# Patient Record
Sex: FEMALE | Race: WHITE | Employment: UNEMPLOYED | URBAN - METROPOLITAN AREA
[De-identification: names, ages, dates, MRNs, and addresses within clinical notes are randomized per-mention and may not be internally consistent; named-entity substitution may affect disease eponyms.]

---

## 2017-02-22 PROCEDURE — 87624 HPV HI-RISK TYP POOLED RSLT: CPT | Performed by: OBSTETRICS & GYNECOLOGY

## 2017-02-22 PROCEDURE — 88175 CYTOPATH C/V AUTO FLUID REDO: CPT | Performed by: OBSTETRICS & GYNECOLOGY

## 2017-09-28 PROBLEM — M50.10 HERNIATION OF CERVICAL INTERVERTEBRAL DISC WITH RADICULOPATHY: Status: ACTIVE | Noted: 2017-09-28

## 2017-09-28 PROBLEM — M48.02 CERVICAL STENOSIS OF SPINE: Status: ACTIVE | Noted: 2017-09-28

## 2017-10-11 PROCEDURE — 87081 CULTURE SCREEN ONLY: CPT | Performed by: FAMILY MEDICINE

## 2017-10-30 PROCEDURE — 81001 URINALYSIS AUTO W/SCOPE: CPT | Performed by: FAMILY MEDICINE

## 2017-10-30 PROCEDURE — 87086 URINE CULTURE/COLONY COUNT: CPT | Performed by: FAMILY MEDICINE

## 2017-11-10 ENCOUNTER — ANESTHESIA (OUTPATIENT)
Dept: SURGERY | Facility: HOSPITAL | Age: 32
DRG: 473 | End: 2017-11-10
Payer: COMMERCIAL

## 2017-11-10 ENCOUNTER — APPOINTMENT (OUTPATIENT)
Dept: GENERAL RADIOLOGY | Facility: HOSPITAL | Age: 32
DRG: 473 | End: 2017-11-10
Attending: ORTHOPAEDIC SURGERY
Payer: COMMERCIAL

## 2017-11-10 ENCOUNTER — HOSPITAL ENCOUNTER (INPATIENT)
Facility: HOSPITAL | Age: 32
LOS: 2 days | Discharge: HOME | DRG: 473 | End: 2017-11-12
Attending: ORTHOPAEDIC SURGERY | Admitting: ORTHOPAEDIC SURGERY
Payer: COMMERCIAL

## 2017-11-10 ENCOUNTER — ANESTHESIA EVENT (OUTPATIENT)
Dept: SURGERY | Facility: HOSPITAL | Age: 32
DRG: 473 | End: 2017-11-10
Payer: COMMERCIAL

## 2017-11-10 ENCOUNTER — SURGERY (OUTPATIENT)
Age: 32
End: 2017-11-10

## 2017-11-10 DIAGNOSIS — M40.12 OTHER SECONDARY KYPHOSIS, CERVICAL REGION: ICD-10-CM

## 2017-11-10 DIAGNOSIS — M48.02 CERVICAL STENOSIS OF SPINE: Primary | ICD-10-CM

## 2017-11-10 DIAGNOSIS — M50.10 HERNIATION OF CERVICAL INTERVERTEBRAL DISC WITH RADICULOPATHY: ICD-10-CM

## 2017-11-10 PROCEDURE — 95938 SOMATOSENSORY TESTING: CPT | Performed by: ORTHOPAEDIC SURGERY

## 2017-11-10 PROCEDURE — 95860 NEEDLE EMG 1 EXTREMITY: CPT | Performed by: ORTHOPAEDIC SURGERY

## 2017-11-10 PROCEDURE — 95939 C MOTOR EVOKED UPR&LWR LIMBS: CPT | Performed by: ORTHOPAEDIC SURGERY

## 2017-11-10 PROCEDURE — 72040 X-RAY EXAM NECK SPINE 2-3 VW: CPT | Performed by: ORTHOPAEDIC SURGERY

## 2017-11-10 PROCEDURE — 85027 COMPLETE CBC AUTOMATED: CPT | Performed by: PHYSICIAN ASSISTANT

## 2017-11-10 PROCEDURE — 81025 URINE PREGNANCY TEST: CPT

## 2017-11-10 PROCEDURE — 76001 XR C-ARM FLUORO >1 HOUR  (CPT=76001): CPT | Performed by: ORTHOPAEDIC SURGERY

## 2017-11-10 PROCEDURE — 0RB30ZZ EXCISION OF CERVICAL VERTEBRAL DISC, OPEN APPROACH: ICD-10-PCS | Performed by: ORTHOPAEDIC SURGERY

## 2017-11-10 PROCEDURE — 95865 NEEDLE EMG LARYNX: CPT | Performed by: ORTHOPAEDIC SURGERY

## 2017-11-10 PROCEDURE — 0RG20A0 FUSION OF 2 OR MORE CERVICAL VERTEBRAL JOINTS WITH INTERBODY FUSION DEVICE, ANTERIOR APPROACH, ANTERIOR COLUMN, OPEN APPROACH: ICD-10-PCS | Performed by: ORTHOPAEDIC SURGERY

## 2017-11-10 RX ORDER — MORPHINE SULFATE 10 MG/ML
6 INJECTION, SOLUTION INTRAMUSCULAR; INTRAVENOUS EVERY 10 MIN PRN
Status: DISCONTINUED | OUTPATIENT
Start: 2017-11-10 | End: 2017-11-10 | Stop reason: HOSPADM

## 2017-11-10 RX ORDER — HALOPERIDOL 5 MG/ML
0.25 INJECTION INTRAMUSCULAR ONCE AS NEEDED
Status: DISCONTINUED | OUTPATIENT
Start: 2017-11-10 | End: 2017-11-10 | Stop reason: HOSPADM

## 2017-11-10 RX ORDER — ROCURONIUM BROMIDE 10 MG/ML
INJECTION, SOLUTION INTRAVENOUS AS NEEDED
Status: DISCONTINUED | OUTPATIENT
Start: 2017-11-10 | End: 2017-11-10 | Stop reason: SURG

## 2017-11-10 RX ORDER — SODIUM CHLORIDE, SODIUM LACTATE, POTASSIUM CHLORIDE, CALCIUM CHLORIDE 600; 310; 30; 20 MG/100ML; MG/100ML; MG/100ML; MG/100ML
INJECTION, SOLUTION INTRAVENOUS CONTINUOUS
Status: DISCONTINUED | OUTPATIENT
Start: 2017-11-10 | End: 2017-11-12

## 2017-11-10 RX ORDER — TIZANIDINE 4 MG/1
4 TABLET ORAL ONCE
Status: COMPLETED | OUTPATIENT
Start: 2017-11-10 | End: 2017-11-10

## 2017-11-10 RX ORDER — MIDAZOLAM HYDROCHLORIDE 1 MG/ML
INJECTION INTRAMUSCULAR; INTRAVENOUS AS NEEDED
Status: DISCONTINUED | OUTPATIENT
Start: 2017-11-10 | End: 2017-11-10 | Stop reason: SURG

## 2017-11-10 RX ORDER — SODIUM CHLORIDE 9 MG/ML
INJECTION, SOLUTION INTRAVENOUS CONTINUOUS PRN
Status: DISCONTINUED | OUTPATIENT
Start: 2017-11-10 | End: 2017-11-10 | Stop reason: SURG

## 2017-11-10 RX ORDER — ONDANSETRON 2 MG/ML
4 INJECTION INTRAMUSCULAR; INTRAVENOUS EVERY 4 HOURS PRN
Status: ACTIVE | OUTPATIENT
Start: 2017-11-10 | End: 2017-11-11

## 2017-11-10 RX ORDER — LIDOCAINE HYDROCHLORIDE 40 MG/ML
SOLUTION TOPICAL AS NEEDED
Status: DISCONTINUED | OUTPATIENT
Start: 2017-11-10 | End: 2017-11-10 | Stop reason: SURG

## 2017-11-10 RX ORDER — LIDOCAINE HYDROCHLORIDE 10 MG/ML
INJECTION, SOLUTION EPIDURAL; INFILTRATION; INTRACAUDAL; PERINEURAL AS NEEDED
Status: DISCONTINUED | OUTPATIENT
Start: 2017-11-10 | End: 2017-11-10 | Stop reason: SURG

## 2017-11-10 RX ORDER — DEXAMETHASONE SODIUM PHOSPHATE 4 MG/ML
8 VIAL (ML) INJECTION EVERY 8 HOURS
Status: COMPLETED | OUTPATIENT
Start: 2017-11-10 | End: 2017-11-11

## 2017-11-10 RX ORDER — METOCLOPRAMIDE HYDROCHLORIDE 5 MG/ML
10 INJECTION INTRAMUSCULAR; INTRAVENOUS EVERY 6 HOURS PRN
Status: DISCONTINUED | OUTPATIENT
Start: 2017-11-10 | End: 2017-11-12

## 2017-11-10 RX ORDER — ONDANSETRON 2 MG/ML
INJECTION INTRAMUSCULAR; INTRAVENOUS AS NEEDED
Status: DISCONTINUED | OUTPATIENT
Start: 2017-11-10 | End: 2017-11-10 | Stop reason: SURG

## 2017-11-10 RX ORDER — DIAZEPAM 5 MG/1
5 TABLET ORAL EVERY 8 HOURS PRN
Status: DISCONTINUED | OUTPATIENT
Start: 2017-11-10 | End: 2017-11-12

## 2017-11-10 RX ORDER — CETIRIZINE HYDROCHLORIDE 10 MG/1
5 TABLET ORAL 2 TIMES DAILY PRN
Status: DISCONTINUED | OUTPATIENT
Start: 2017-11-10 | End: 2017-11-12

## 2017-11-10 RX ORDER — HYDROCODONE BITARTRATE AND ACETAMINOPHEN 10; 325 MG/1; MG/1
2 TABLET ORAL EVERY 6 HOURS PRN
Status: DISCONTINUED | OUTPATIENT
Start: 2017-11-10 | End: 2017-11-12

## 2017-11-10 RX ORDER — ACETAMINOPHEN 500 MG
1000 TABLET ORAL ONCE
Status: COMPLETED | OUTPATIENT
Start: 2017-11-10 | End: 2017-11-10

## 2017-11-10 RX ORDER — METHYLPREDNISOLONE 4 MG/1
TABLET ORAL
Qty: 21 TABLET | Refills: 0 | Status: SHIPPED | OUTPATIENT
Start: 2017-11-10 | End: 2017-12-21

## 2017-11-10 RX ORDER — DEXAMETHASONE SODIUM PHOSPHATE 4 MG/ML
VIAL (ML) INJECTION AS NEEDED
Status: DISCONTINUED | OUTPATIENT
Start: 2017-11-10 | End: 2017-11-10 | Stop reason: SURG

## 2017-11-10 RX ORDER — HYDROCODONE BITARTRATE AND ACETAMINOPHEN 5; 325 MG/1; MG/1
1 TABLET ORAL AS NEEDED
Status: DISCONTINUED | OUTPATIENT
Start: 2017-11-10 | End: 2017-11-10 | Stop reason: HOSPADM

## 2017-11-10 RX ORDER — DIPHENHYDRAMINE HYDROCHLORIDE 50 MG/ML
25 INJECTION INTRAMUSCULAR; INTRAVENOUS EVERY 4 HOURS PRN
Status: DISCONTINUED | OUTPATIENT
Start: 2017-11-10 | End: 2017-11-12

## 2017-11-10 RX ORDER — DIPHENHYDRAMINE HCL 25 MG
25 CAPSULE ORAL EVERY 4 HOURS PRN
Status: DISCONTINUED | OUTPATIENT
Start: 2017-11-10 | End: 2017-11-12

## 2017-11-10 RX ORDER — BISACODYL 10 MG
10 SUPPOSITORY, RECTAL RECTAL
Status: DISCONTINUED | OUTPATIENT
Start: 2017-11-10 | End: 2017-11-12

## 2017-11-10 RX ORDER — ONDANSETRON 2 MG/ML
4 INJECTION INTRAMUSCULAR; INTRAVENOUS ONCE AS NEEDED
Status: DISCONTINUED | OUTPATIENT
Start: 2017-11-10 | End: 2017-11-10 | Stop reason: HOSPADM

## 2017-11-10 RX ORDER — MAGNESIUM HYDROXIDE 1200 MG/15ML
LIQUID ORAL CONTINUOUS PRN
Status: DISCONTINUED | OUTPATIENT
Start: 2017-11-10 | End: 2017-11-10

## 2017-11-10 RX ORDER — DOCUSATE SODIUM 100 MG/1
100 CAPSULE, LIQUID FILLED ORAL 2 TIMES DAILY
Status: DISCONTINUED | OUTPATIENT
Start: 2017-11-10 | End: 2017-11-12

## 2017-11-10 RX ORDER — SENNOSIDES 8.6 MG
17.2 TABLET ORAL NIGHTLY
Status: DISCONTINUED | OUTPATIENT
Start: 2017-11-10 | End: 2017-11-12

## 2017-11-10 RX ORDER — HYDROMORPHONE HYDROCHLORIDE 1 MG/ML
0.2 INJECTION, SOLUTION INTRAMUSCULAR; INTRAVENOUS; SUBCUTANEOUS EVERY 5 MIN PRN
Status: DISCONTINUED | OUTPATIENT
Start: 2017-11-10 | End: 2017-11-10 | Stop reason: HOSPADM

## 2017-11-10 RX ORDER — MORPHINE SULFATE 4 MG/ML
4 INJECTION, SOLUTION INTRAMUSCULAR; INTRAVENOUS EVERY 10 MIN PRN
Status: DISCONTINUED | OUTPATIENT
Start: 2017-11-10 | End: 2017-11-10 | Stop reason: HOSPADM

## 2017-11-10 RX ORDER — DEXAMETHASONE SODIUM PHOSPHATE 4 MG/ML
10 VIAL (ML) INJECTION ONCE
Status: DISCONTINUED | OUTPATIENT
Start: 2017-11-10 | End: 2017-11-10 | Stop reason: HOSPADM

## 2017-11-10 RX ORDER — EPHEDRINE SULFATE 50 MG/ML
INJECTION, SOLUTION INTRAVENOUS AS NEEDED
Status: DISCONTINUED | OUTPATIENT
Start: 2017-11-10 | End: 2017-11-10 | Stop reason: SURG

## 2017-11-10 RX ORDER — POLYETHYLENE GLYCOL 3350 17 G/17G
17 POWDER, FOR SOLUTION ORAL DAILY PRN
Status: DISCONTINUED | OUTPATIENT
Start: 2017-11-10 | End: 2017-11-12

## 2017-11-10 RX ORDER — GABAPENTIN 300 MG/1
600 CAPSULE ORAL ONCE
Status: COMPLETED | OUTPATIENT
Start: 2017-11-10 | End: 2017-11-10

## 2017-11-10 RX ORDER — FAMOTIDINE 20 MG/1
20 TABLET ORAL ONCE
Status: COMPLETED | OUTPATIENT
Start: 2017-11-10 | End: 2017-11-10

## 2017-11-10 RX ORDER — HYDROCODONE BITARTRATE AND ACETAMINOPHEN 10; 325 MG/1; MG/1
1 TABLET ORAL EVERY 4 HOURS PRN
Status: DISCONTINUED | OUTPATIENT
Start: 2017-11-10 | End: 2017-11-12

## 2017-11-10 RX ORDER — SODIUM PHOSPHATE, DIBASIC AND SODIUM PHOSPHATE, MONOBASIC 7; 19 G/133ML; G/133ML
1 ENEMA RECTAL ONCE AS NEEDED
Status: DISCONTINUED | OUTPATIENT
Start: 2017-11-10 | End: 2017-11-12

## 2017-11-10 RX ORDER — ACETAMINOPHEN 325 MG/1
650 TABLET ORAL EVERY 4 HOURS PRN
Status: DISCONTINUED | OUTPATIENT
Start: 2017-11-10 | End: 2017-11-12

## 2017-11-10 RX ORDER — NALOXONE HYDROCHLORIDE 0.4 MG/ML
80 INJECTION, SOLUTION INTRAMUSCULAR; INTRAVENOUS; SUBCUTANEOUS AS NEEDED
Status: DISCONTINUED | OUTPATIENT
Start: 2017-11-10 | End: 2017-11-10 | Stop reason: HOSPADM

## 2017-11-10 RX ORDER — MORPHINE SULFATE 2 MG/ML
2 INJECTION, SOLUTION INTRAMUSCULAR; INTRAVENOUS EVERY 10 MIN PRN
Status: DISCONTINUED | OUTPATIENT
Start: 2017-11-10 | End: 2017-11-10 | Stop reason: HOSPADM

## 2017-11-10 RX ORDER — HYDROMORPHONE HYDROCHLORIDE 1 MG/ML
0.8 INJECTION, SOLUTION INTRAMUSCULAR; INTRAVENOUS; SUBCUTANEOUS EVERY 2 HOUR PRN
Status: DISCONTINUED | OUTPATIENT
Start: 2017-11-10 | End: 2017-11-12

## 2017-11-10 RX ORDER — DEXAMETHASONE SODIUM PHOSPHATE 10 MG/ML
8 INJECTION, SOLUTION INTRAMUSCULAR; INTRAVENOUS EVERY 8 HOURS
Status: DISCONTINUED | OUTPATIENT
Start: 2017-11-10 | End: 2017-11-10 | Stop reason: RX

## 2017-11-10 RX ORDER — METOCLOPRAMIDE 10 MG/1
10 TABLET ORAL ONCE
Status: COMPLETED | OUTPATIENT
Start: 2017-11-10 | End: 2017-11-10

## 2017-11-10 RX ORDER — SCOLOPAMINE TRANSDERMAL SYSTEM 1 MG/1
1 PATCH, EXTENDED RELEASE TRANSDERMAL ONCE
Status: DISCONTINUED | OUTPATIENT
Start: 2017-11-10 | End: 2017-11-10

## 2017-11-10 RX ORDER — HYDROMORPHONE HYDROCHLORIDE 1 MG/ML
0.6 INJECTION, SOLUTION INTRAMUSCULAR; INTRAVENOUS; SUBCUTANEOUS EVERY 5 MIN PRN
Status: DISCONTINUED | OUTPATIENT
Start: 2017-11-10 | End: 2017-11-10 | Stop reason: HOSPADM

## 2017-11-10 RX ORDER — BUPIVACAINE HYDROCHLORIDE 2.5 MG/ML
INJECTION, SOLUTION EPIDURAL; INFILTRATION; INTRACAUDAL AS NEEDED
Status: DISCONTINUED | OUTPATIENT
Start: 2017-11-10 | End: 2017-11-10 | Stop reason: HOSPADM

## 2017-11-10 RX ORDER — HYDROMORPHONE HYDROCHLORIDE 1 MG/ML
1.2 INJECTION, SOLUTION INTRAMUSCULAR; INTRAVENOUS; SUBCUTANEOUS EVERY 2 HOUR PRN
Status: DISCONTINUED | OUTPATIENT
Start: 2017-11-10 | End: 2017-11-12

## 2017-11-10 RX ORDER — HYDROMORPHONE HYDROCHLORIDE 1 MG/ML
0.4 INJECTION, SOLUTION INTRAMUSCULAR; INTRAVENOUS; SUBCUTANEOUS EVERY 2 HOUR PRN
Status: DISCONTINUED | OUTPATIENT
Start: 2017-11-10 | End: 2017-11-12

## 2017-11-10 RX ORDER — HYDROMORPHONE HYDROCHLORIDE 1 MG/ML
0.4 INJECTION, SOLUTION INTRAMUSCULAR; INTRAVENOUS; SUBCUTANEOUS EVERY 5 MIN PRN
Status: DISCONTINUED | OUTPATIENT
Start: 2017-11-10 | End: 2017-11-10 | Stop reason: HOSPADM

## 2017-11-10 RX ORDER — HYDROCODONE BITARTRATE AND ACETAMINOPHEN 5; 325 MG/1; MG/1
2 TABLET ORAL AS NEEDED
Status: DISCONTINUED | OUTPATIENT
Start: 2017-11-10 | End: 2017-11-10 | Stop reason: HOSPADM

## 2017-11-10 RX ORDER — SODIUM CHLORIDE 9 MG/ML
INJECTION, SOLUTION INTRAVENOUS CONTINUOUS
Status: DISCONTINUED | OUTPATIENT
Start: 2017-11-10 | End: 2017-11-12

## 2017-11-10 RX ADMIN — EPHEDRINE SULFATE 5 MG: 50 INJECTION, SOLUTION INTRAVENOUS at 08:04:00

## 2017-11-10 RX ADMIN — SODIUM CHLORIDE, SODIUM LACTATE, POTASSIUM CHLORIDE, CALCIUM CHLORIDE: 600; 310; 30; 20 INJECTION, SOLUTION INTRAVENOUS at 10:15:00

## 2017-11-10 RX ADMIN — SODIUM CHLORIDE: 9 INJECTION, SOLUTION INTRAVENOUS at 07:55:00

## 2017-11-10 RX ADMIN — ONDANSETRON 4 MG: 2 INJECTION INTRAMUSCULAR; INTRAVENOUS at 09:55:00

## 2017-11-10 RX ADMIN — HYDROMORPHONE HYDROCHLORIDE 0.5 MG: 1 INJECTION, SOLUTION INTRAMUSCULAR; INTRAVENOUS; SUBCUTANEOUS at 09:00:00

## 2017-11-10 RX ADMIN — ROCURONIUM BROMIDE 10 MG: 10 INJECTION, SOLUTION INTRAVENOUS at 07:44:00

## 2017-11-10 RX ADMIN — MIDAZOLAM HYDROCHLORIDE 2 MG: 1 INJECTION INTRAMUSCULAR; INTRAVENOUS at 07:39:00

## 2017-11-10 RX ADMIN — DEXAMETHASONE SODIUM PHOSPHATE 4 MG: 4 MG/ML VIAL (ML) INJECTION at 07:55:00

## 2017-11-10 RX ADMIN — LIDOCAINE HYDROCHLORIDE 4 ML: 40 SOLUTION TOPICAL at 07:45:00

## 2017-11-10 RX ADMIN — HYDROMORPHONE HYDROCHLORIDE 0.2 MG: 1 INJECTION, SOLUTION INTRAMUSCULAR; INTRAVENOUS; SUBCUTANEOUS at 10:32:00

## 2017-11-10 RX ADMIN — HYDROMORPHONE HYDROCHLORIDE 0.3 MG: 1 INJECTION, SOLUTION INTRAMUSCULAR; INTRAVENOUS; SUBCUTANEOUS at 10:25:00

## 2017-11-10 RX ADMIN — SODIUM CHLORIDE: 9 INJECTION, SOLUTION INTRAVENOUS at 10:15:00

## 2017-11-10 RX ADMIN — LIDOCAINE HYDROCHLORIDE 50 MG: 10 INJECTION, SOLUTION EPIDURAL; INFILTRATION; INTRACAUDAL; PERINEURAL at 07:44:00

## 2017-11-10 NOTE — ANESTHESIA PREPROCEDURE EVALUATION
Anesthesia PreOp Note    HPI:     Oleg Desir is a 27year old female who presents for preoperative consultation requested by: Layla Barkley MD    Date of Surgery: 11/10/2017    Procedure(s):  ANTERIOR CERVICAL FUSION BG & INST 1 LEVEL  Indication: Cer INJECTION;                 Surgeon: Stephanie Boone DO;  Location: [address] [place] [address]  No date: OTHER SURGICAL HISTORY      Comment: endoscopy-2000  No date: SPECIAL SERVICE OR REPORT      Comment: S/P WISDOM TEETH EXTRACTION      Pr tia- 2007   • Other Sulema Comings Mother    • Gastro-Intestinal Disorder Brother      diverticulitis   • Diabetes Maternal Grandmother      insulin dep   • Heart Disease Maternal Grandfather    • Cancer Neg      breast, ovarian, colon       Social History  Socia Anesthesia ROS/Med Hx and Physical Exam     Patient summary reviewed and Nursing notes reviewed    Airway   Mallampati: II  TM distance: >3 FB  Neck ROM: full  Dental      Pulmonary - normal exam   (+) sleep apnea (noCPAP,meds only,not taking ),   C

## 2017-11-10 NOTE — INTERVAL H&P NOTE
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

## 2017-11-10 NOTE — H&P
[facility] Hospitalist Team  History and Physical     ASSESSMENT / PLAN:   28 yo with seasonal allergies, narcolepsy-off meds, cervical dysplasia cervical spinal stenosis who presents for surgery.  Pt is S/P ACDF C4-C7, see below for details    S/P ACDF C4-C&  -pr unspecified    • CERVICAL DYSPLASIA 12/09    Severe dysplasia   • Chronic rhinitis     seasonal   • Esophageal reflux     was on Nexium   • HEADACHES     migraines   • Hx of diseases NEC     HX OF NARCOLEPSY   • HYPERLIPIDEMIA    • Kidney disease     nephr oz/week     Comment: socially        Fam Hx  Family History   Problem Relation Age of Onset   • Heart Disorder Other    • Heart Attack Father    • Heart Disease Father    • Other [OTHER] Father    • Stroke Mother    • High Blood Pressure Mother    • Hypert no crackles or wheezes  CV: RRR, no murmurs   ABD: Soft, non-tender, non-distended, +BS  MSK: strength 5/5 in all extremities  Neuro: Grossly normal, CN intact, sensory intact  Psych: Affect- normal  SKIN: warm, dry  EXT: no edema    Assessment/Plan    34

## 2017-11-10 NOTE — ANESTHESIA POSTPROCEDURE EVALUATION
Patient: Tato Walker    Procedure Summary     Date:  11/10/17 Room / Location:  [address] [place] [address] MAIN OR 10 / [address] [facility] MAIN OR    Anesthesia Start:  9806 Anesthesia Stop:      Procedure:  ANTERIOR CERVICAL FUSION BG & INST 1 LEVEL (N/A ) Diagnosis:       Cervical stenos

## 2017-11-10 NOTE — OPERATIVE REPORT
[facility] AND [facility]   PATIENT'S NAME:  Robert REZAEDLER   ATTENDING PHYSICIAN:  Batool Montana M.D. OPERATING PHYSICIAN:  Batool Montana M.D.     PATIENT CSN#: [id number]  MEDICAL RECORD #:  [id number]  YOB: 1985  ADMISSION DATE: 11/10/2017  OPERAT sterile fashion. Once draped, we used C-arm fluoroscopy to confirm our level and then injected 10 mL of 0.25% Marcaine plain in the subcutaneous tissue.  We then made a curvilinear incision coming from the patient's right side and dissected down to the adi interbody levels. We placed 2 screws at each level from C4-C7. Once the plate was inserted, we took final AP and lateral imaging, confirmed our placement and were satisfied with our location. We then placed an 18-gauge Angiocath below the platysmal layer.

## 2017-11-10 NOTE — H&P
HISTORY OF CHIEF COMPLAINT:    Sharri Reyes is a 27year old female, who presents for surgery. She was last seen by Dr. Daniel Gilbert in September with continued complaints of neck pain with bilateral arm pain for several years.  At that time he recommended C4-C FLUOR GID & LOCLZJ NDL/CATH SPI DX/THER NJX N/A      Comment: Procedure: CERVICAL EPIDURAL INJECTION;                 Surgeon: Rebekah Tam DO;  Location: [address] [place] [address]  6/29/2015: INJECTION, W/WO CONTRAST, DX/THERAPEUTIC SUB Male     Other Topics            Concern   Service        No  Blood Transfusions      No  Caffeine Concern        No  Occupational Exposure   No  Hobby Hazards           No  Sleep Concern           No  Stress Concern          No  Weight Concern bruising or excessive bleeding. ENDOCRINE: denies excessive thirst or urination; denies unexpected wt gain or wt loss.   MUSCULOSKELETAL: DENIES:, Muscle cramps, Joint pain, Swelling of joints, History of arthritis, Fibromyalgia, Osteoporosis, Hardware, [employer] noted:  paresthesias in the left arm C7 dermatome      HEAD/NECK: Head is normocephalic  EYES: EOMI, EVELYN  SKIN EXAM: Skin is intact, head, neck, trunk and arms/legs. No rashes, mottling or ulcerations.   LYMPH EXAM: There is no lymph edema in either lower foramina are widely patent. There is moderate mucosal thickening in the visualized portion of the right maxillary sinus.   Dedicated CT of the paranasal sinuses could be obtained for more complete evaluation, if clinically  indicated.  =====  IMPRESSION: go to the ER.      The patient indicates understanding of these issues and agrees to the plan.   Thank you for the opportunity to help care for your patient.        Sher Bacon PA-C

## 2017-11-11 ENCOUNTER — APPOINTMENT (OUTPATIENT)
Dept: GENERAL RADIOLOGY | Facility: HOSPITAL | Age: 32
DRG: 473 | End: 2017-11-11
Attending: PHYSICIAN ASSISTANT
Payer: COMMERCIAL

## 2017-11-11 PROCEDURE — 97165 OT EVAL LOW COMPLEX 30 MIN: CPT

## 2017-11-11 PROCEDURE — 97530 THERAPEUTIC ACTIVITIES: CPT

## 2017-11-11 PROCEDURE — 97162 PT EVAL MOD COMPLEX 30 MIN: CPT

## 2017-11-11 PROCEDURE — 72040 X-RAY EXAM NECK SPINE 2-3 VW: CPT | Performed by: PHYSICIAN ASSISTANT

## 2017-11-11 PROCEDURE — 97116 GAIT TRAINING THERAPY: CPT

## 2017-11-11 PROCEDURE — 85027 COMPLETE CBC AUTOMATED: CPT | Performed by: PHYSICIAN ASSISTANT

## 2017-11-11 RX ORDER — 0.9 % SODIUM CHLORIDE 0.9 %
VIAL (ML) INJECTION
Status: COMPLETED
Start: 2017-11-11 | End: 2017-11-11

## 2017-11-11 RX ORDER — DEXAMETHASONE SODIUM PHOSPHATE 4 MG/ML
8 VIAL (ML) INJECTION EVERY 8 HOURS
Status: DISCONTINUED | OUTPATIENT
Start: 2017-11-11 | End: 2017-11-12

## 2017-11-11 NOTE — OCCUPATIONAL THERAPY NOTE
OCCUPATIONAL THERAPY EVALUATION - INPATIENT      Room Number: 402/402-A  Evaluation Date: 11/11/2017  Type of Evaluation: Initial  Presenting Problem:  (s/p C4-C7 ACDF)    Physician Order: IP Consult to Occupational Therapy  Reason for Therapy: ADL/IADL Dy child   • Narcolepsy    • SEASONAL ALLERGIES     not on meds   • SINUSITIS     occasional   • VARICELLA     childhood   • Visual impairment     glasses       Past Surgical History  Past Surgical History:  11/10/2017: BACK SURGERY      Comment: ACDF C4-C7 Repositioning    COGNITION  Overall Cognitive Status:  WFL - within functional limits  Orientation Level:  oriented x4  Following Commands:  follows multistep commands consistently    RANGE OF MOTION   Upper extremity ROM is within functional limits    STR addressed; Family present    OT Goals    Patient self-stated goal is: Recover from surgery      Pt will not require further skilled OT and will be discharged from OT at this time. RN aware of OT recommendations.      Wayne Thornton OTR/SANDRA 11/11/2017

## 2017-11-11 NOTE — PROGRESS NOTES
S:   Denies difficulty breathing. She does feel swallowing is painful, but she is tolerating liquids. She has done well with Decadron. Denies arm pain or numbness. She has been up walking and has been cleared by PT and OT.  Pt considering d/c home today, jaymie

## 2017-11-11 NOTE — PROGRESS NOTES
[facility] Hospitalist Progress Note     CC: Hospital Follow up    PCP: Antoinette Rodriguez DO       Assessment/Plan:     Principal Problem:    Cervical stenosis of spine        Ms. Roberto Kearns is a 26 yo with seasonal allergies, narcolepsy-off meds, cervical dysplasia cer CTAB, no crackles or wheezes  CV: RRR, no murmurs   ABD: Soft, non-tender, non-distended, +BS  MSK: strength 5/5 in all extremities  Neuro: Grossly normal, CN intact, sensory intact  Psych: Affect- normal  SKIN: warm, dry  EXT: no edema      Data Review:

## 2017-11-11 NOTE — PHYSICAL THERAPY NOTE
PHYSICAL THERAPY EVALUATION - INPATIENT     Room Number: 402/402-A  Evaluation Date: 11/11/2017  Type of Evaluation: Initial  Physician Order: PT Eval and Treat    Presenting Problem: neck fusion  Reason for Therapy: Mobility Dysfunction and Discharge occasional   • VARICELLA     childhood   • Visual impairment     glasses       Past Surgical History  Past Surgical History:  11/10/2017: BACK SURGERY      Comment: ACDF C4-C7  6/29/2015: FLUOR GIJERMAINE & Guanako Tellez NDL/CATH SPI DX/THER MRW N/A      Comment: Proced see OT eval    Lower extremity ROM is within functional limits     Lower extremity strength is within functional limits     BALANCE  Static Sitting: Good  Dynamic Sitting: Good  Static Standing: Fair +  Dynamic Standing: Fair +         ACTIVITY TOLERANCE with none     Goal #2  Current Status    Goal #3 Patient is able to ambulate 400 feet with assist device: none at assistance level: independent   Goal #3   Current Status    Goal #4 Patient will negotiate 12 stairs/one curb w/ assistive device and supervis

## 2017-11-12 VITALS
BODY MASS INDEX: 29.45 KG/M2 | HEART RATE: 68 BPM | SYSTOLIC BLOOD PRESSURE: 104 MMHG | DIASTOLIC BLOOD PRESSURE: 53 MMHG | OXYGEN SATURATION: 96 % | RESPIRATION RATE: 16 BRPM | TEMPERATURE: 97 F | HEIGHT: 60 IN | WEIGHT: 150 LBS

## 2017-11-12 PROCEDURE — 97116 GAIT TRAINING THERAPY: CPT

## 2017-11-12 NOTE — DISCHARGE SUMMARY
General Medicine Discharge Summary     Patient ID:  Evens Lee  27year old  3/4/1985    Admit date: 11/10/2017    Discharge date and time: 11/12/17    Attending Physician: Petra Powell MD    Primary Care Physician: Michael Ross DO     Reason fo 11/11/2017  CONCLUSION:  1. Stable postop changes without complication. Xr Cervical Spine (2 Views) (edr=[id number])    Result Date: 11/10/2017  CONCLUSION: See above.          Xr C-arm Fluoro >1 Hour  (cpt=76001)    Result Date: 11/10/2017  CONCLUSION: Activity: as instructed  Diet: regular diet  Wound Care: keep wound clean and dry  Code Status: Full Code  O2: n/a    Follow-up with:    PCP   Specialist        FU  Follow-up Information     Erin Phan PA-C In 10 days.     Specialty:  Physician A had laparoscopic surgery, to feel shoulder pain or discomfort on the day of surgery. · For some patients to have nausea after surgery/anesthesia    If you feel nausea or experience vomiting:   · Try to move around less.    · Eat less than usual or drink o position or activity is needed.) Sleep either on your back or side. If you have been instructed to wear a brace, sleep with the supportive cushion under the back of your neck.     5- Diet: As your throat will be sore from surgery, gradually advance your die and then wrap pack or bag in a towel before you use it. You may also need to use pain medicine. If you do need pain medicine, take the medicine you were prescribed as directed.  Do not increase the pain medicine dosage without first contacting your doctor you call your pharmacy directly, they will then contact [facility]. 2. Use the same pharmacy. That way, your pharmacist will have your complete prescription records and you avoid the danger of mixing medications.  To make it convenient for you [facility] will work with possible duplication of prescriptions when an order is lost or stolen, you will want to talk with your doctor. Call [facility] to schedule an appointment.     [address] [facility]   [phone]            Follow-up with labs: none    Total Time

## 2017-11-12 NOTE — PHYSICAL THERAPY NOTE
PHYSICAL THERAPY TREATMENT NOTE - INPATIENT    Room Number: 343/326-E       Presenting Problem: neck fusion    Problem List  Principal Problem:    Cervical stenosis of spine      ASSESSMENT   Received pt in the bed.  Pt is IND with log rolling and to trans and from a bed to a chair (including a wheelchair)?: None   -   Need to walk in hospital room?: None   -   Climbing 3-5 steps with a railing?: None    AM-PAC Score:  Raw Score: 24   PT Approx Degree of Impairment Score: 0%   Standardized Score (AM-PAC Scal

## 2017-11-12 NOTE — PROGRESS NOTES
S:   Denies difficulty breathing or swallowing. Swallowing significantly improved with additional steroids. She is tolerating soft foods. Denies arm pain or numbness. She has been walking well. She feels ready to d/c home today. No additional complaints.

## 2017-12-21 PROBLEM — Z47.89 ORTHOPEDIC AFTERCARE: Status: ACTIVE | Noted: 2017-12-21

## 2017-12-21 PROBLEM — Z98.1 S/P CERVICAL SPINAL FUSION: Status: ACTIVE | Noted: 2017-12-21

## 2018-02-06 PROBLEM — M43.22 CERVICAL VERTEBRAL FUSION: Status: ACTIVE | Noted: 2018-02-06

## 2018-02-06 PROBLEM — R20.0 NUMBNESS AND TINGLING IN LEFT ARM: Status: ACTIVE | Noted: 2018-02-06

## 2018-02-06 PROBLEM — R20.2 NUMBNESS AND TINGLING IN LEFT ARM: Status: ACTIVE | Noted: 2018-02-06

## 2018-02-06 PROBLEM — M54.2 BILATERAL POSTERIOR NECK PAIN: Status: ACTIVE | Noted: 2018-02-06

## 2018-04-17 ENCOUNTER — APPOINTMENT (OUTPATIENT)
Dept: LAB | Facility: HOSPITAL | Age: 33
End: 2018-04-17
Attending: INTERNAL MEDICINE
Payer: COMMERCIAL

## 2018-04-17 DIAGNOSIS — R10.13 EPIGASTRIC ABDOMINAL PAIN: ICD-10-CM

## 2018-04-17 DIAGNOSIS — R11.2 NAUSEA AND VOMITING, INTRACTABILITY OF VOMITING NOT SPECIFIED, UNSPECIFIED VOMITING TYPE: ICD-10-CM

## 2018-04-17 PROCEDURE — 83013 H PYLORI (C-13) BREATH: CPT

## 2018-05-30 PROCEDURE — 87624 HPV HI-RISK TYP POOLED RSLT: CPT | Performed by: OBSTETRICS & GYNECOLOGY

## 2018-05-30 PROCEDURE — 88175 CYTOPATH C/V AUTO FLUID REDO: CPT | Performed by: OBSTETRICS & GYNECOLOGY

## 2019-02-06 PROBLEM — O09.899 HX OF PRETERM DELIVERY, CURRENTLY PREGNANT: Status: ACTIVE | Noted: 2019-02-06

## 2019-02-06 PROBLEM — Z98.890 HX LEEP (LOOP ELECTROSURGICAL EXCISION PROCEDURE), CERVIX, PREGNANCY: Status: ACTIVE | Noted: 2019-02-06

## 2019-02-06 PROBLEM — O34.40 HX LEEP (LOOP ELECTROSURGICAL EXCISION PROCEDURE), CERVIX, PREGNANCY: Status: ACTIVE | Noted: 2019-02-06

## 2019-02-06 PROBLEM — O09.521 MULTIGRAVIDA OF ADVANCED MATERNAL AGE IN FIRST TRIMESTER: Status: ACTIVE | Noted: 2019-02-06

## 2019-02-06 PROCEDURE — 87389 HIV-1 AG W/HIV-1&-2 AB AG IA: CPT | Performed by: OBSTETRICS & GYNECOLOGY

## 2019-02-06 PROCEDURE — 87086 URINE CULTURE/COLONY COUNT: CPT | Performed by: OBSTETRICS & GYNECOLOGY

## 2019-02-06 PROCEDURE — 87624 HPV HI-RISK TYP POOLED RSLT: CPT | Performed by: OBSTETRICS & GYNECOLOGY

## 2019-02-06 PROCEDURE — 88175 CYTOPATH C/V AUTO FLUID REDO: CPT | Performed by: OBSTETRICS & GYNECOLOGY

## 2019-06-12 PROCEDURE — 86900 BLOOD TYPING SEROLOGIC ABO: CPT | Performed by: OBSTETRICS & GYNECOLOGY

## 2019-06-12 PROCEDURE — 87389 HIV-1 AG W/HIV-1&-2 AB AG IA: CPT | Performed by: OBSTETRICS & GYNECOLOGY

## 2019-06-12 PROCEDURE — 86901 BLOOD TYPING SEROLOGIC RH(D): CPT | Performed by: OBSTETRICS & GYNECOLOGY

## 2019-06-19 PROBLEM — O24.419 GESTATIONAL DIABETES MELLITUS (GDM), ANTEPARTUM, GESTATIONAL DIABETES METHOD OF CONTROL UNSPECIFIED: Status: ACTIVE | Noted: 2019-06-19

## 2019-08-14 PROCEDURE — 87653 STREP B DNA AMP PROBE: CPT | Performed by: OBSTETRICS & GYNECOLOGY

## 2019-08-14 PROCEDURE — 87081 CULTURE SCREEN ONLY: CPT | Performed by: OBSTETRICS & GYNECOLOGY

## 2019-08-28 ENCOUNTER — APPOINTMENT (OUTPATIENT)
Dept: OBGYN CLINIC | Facility: HOSPITAL | Age: 34
End: 2019-08-28
Payer: COMMERCIAL

## 2019-08-28 ENCOUNTER — HOSPITAL ENCOUNTER (INPATIENT)
Facility: HOSPITAL | Age: 34
LOS: 3 days | Discharge: HOME | End: 2019-08-31
Attending: OBSTETRICS & GYNECOLOGY | Admitting: OBSTETRICS & GYNECOLOGY
Payer: COMMERCIAL

## 2019-08-28 PROBLEM — O13.9 GESTATIONAL HYPERTENSION AFFECTING SECOND PREGNANCY: Status: ACTIVE | Noted: 2019-08-28

## 2019-08-28 LAB
ALBUMIN SERPL-MCNC: 2.7 G/DL (ref 3.4–5)
ALBUMIN/GLOB SERPL: 0.7 {RATIO} (ref 1–2)
ALP LIVER SERPL-CCNC: 151 U/L (ref 37–98)
ALT SERPL-CCNC: 47 U/L (ref 13–56)
ANION GAP SERPL CALC-SCNC: 11 MMOL/L (ref 0–18)
ANTIBODY SCREEN: NEGATIVE
AST SERPL-CCNC: 37 U/L (ref 15–37)
BILIRUB SERPL-MCNC: 0.4 MG/DL (ref 0.1–2)
BUN BLD-MCNC: 9 MG/DL (ref 7–18)
BUN/CREAT SERPL: 13.8 (ref 10–20)
CALCIUM BLD-MCNC: 8.4 MG/DL (ref 8.5–10.1)
CHLORIDE SERPL-SCNC: 110 MMOL/L (ref 98–112)
CO2 SERPL-SCNC: 19 MMOL/L (ref 21–32)
CREAT BLD-MCNC: 0.65 MG/DL (ref 0.55–1.02)
DEPRECATED RDW RBC AUTO: 44.8 FL (ref 35.1–46.3)
ERYTHROCYTE [DISTWIDTH] IN BLOOD BY AUTOMATED COUNT: 13.7 % (ref 11–15)
GLOBULIN PLAS-MCNC: 3.8 G/DL (ref 2.8–4.4)
GLUCOSE BLD-MCNC: 78 MG/DL (ref 70–99)
GLUCOSE BLDC GLUCOMTR-MCNC: 90 MG/DL (ref 70–99)
HCT VFR BLD AUTO: 35.2 % (ref 35–48)
HGB BLD-MCNC: 12 G/DL (ref 12–16)
M PROTEIN MFR SERPL ELPH: 6.5 G/DL (ref 6.4–8.2)
MCH RBC QN AUTO: 30.7 PG (ref 26–34)
MCHC RBC AUTO-ENTMCNC: 34.1 G/DL (ref 31–37)
MCV RBC AUTO: 90 FL (ref 80–100)
OSMOLALITY SERPL CALC.SUM OF ELEC: 288 MOSM/KG (ref 275–295)
PLATELET # BLD AUTO: 249 10(3)UL (ref 150–450)
POTASSIUM SERPL-SCNC: 3.4 MMOL/L (ref 3.5–5.1)
RBC # BLD AUTO: 3.91 X10(6)UL (ref 3.8–5.3)
RH BLOOD TYPE: POSITIVE
SODIUM SERPL-SCNC: 140 MMOL/L (ref 136–145)
URATE SERPL-MCNC: 3.7 MG/DL (ref 2.6–6)
WBC # BLD AUTO: 12 X10(3) UL (ref 4–11)

## 2019-08-28 PROCEDURE — 86900 BLOOD TYPING SEROLOGIC ABO: CPT | Performed by: OBSTETRICS & GYNECOLOGY

## 2019-08-28 PROCEDURE — S0028 INJECTION, FAMOTIDINE, 20 MG: HCPCS | Performed by: OBSTETRICS & GYNECOLOGY

## 2019-08-28 PROCEDURE — 80053 COMPREHEN METABOLIC PANEL: CPT | Performed by: OBSTETRICS & GYNECOLOGY

## 2019-08-28 PROCEDURE — 85027 COMPLETE CBC AUTOMATED: CPT | Performed by: OBSTETRICS & GYNECOLOGY

## 2019-08-28 PROCEDURE — 86901 BLOOD TYPING SEROLOGIC RH(D): CPT | Performed by: OBSTETRICS & GYNECOLOGY

## 2019-08-28 PROCEDURE — 86850 RBC ANTIBODY SCREEN: CPT | Performed by: OBSTETRICS & GYNECOLOGY

## 2019-08-28 PROCEDURE — 82962 GLUCOSE BLOOD TEST: CPT

## 2019-08-28 PROCEDURE — 3E033VJ INTRODUCTION OF OTHER HORMONE INTO PERIPHERAL VEIN, PERCUTANEOUS APPROACH: ICD-10-PCS | Performed by: OBSTETRICS & GYNECOLOGY

## 2019-08-28 PROCEDURE — 84550 ASSAY OF BLOOD/URIC ACID: CPT | Performed by: OBSTETRICS & GYNECOLOGY

## 2019-08-28 RX ORDER — SODIUM CHLORIDE 0.9 % (FLUSH) 0.9 %
10 SYRINGE (ML) INJECTION AS NEEDED
Status: DISCONTINUED | OUTPATIENT
Start: 2019-08-28 | End: 2019-08-29

## 2019-08-28 RX ORDER — AMMONIA INHALANTS 0.04 G/.3ML
0.3 INHALANT RESPIRATORY (INHALATION) AS NEEDED
Status: DISCONTINUED | OUTPATIENT
Start: 2019-08-28 | End: 2019-08-29

## 2019-08-28 RX ORDER — FAMOTIDINE 10 MG/ML
20 INJECTION, SOLUTION INTRAVENOUS NIGHTLY
Status: DISCONTINUED | OUTPATIENT
Start: 2019-08-28 | End: 2019-08-29

## 2019-08-28 RX ORDER — IBUPROFEN 600 MG/1
600 TABLET ORAL ONCE AS NEEDED
Status: DISCONTINUED | OUTPATIENT
Start: 2019-08-28 | End: 2019-08-29

## 2019-08-28 RX ORDER — TERBUTALINE SULFATE 1 MG/ML
0.25 INJECTION, SOLUTION SUBCUTANEOUS AS NEEDED
Status: DISCONTINUED | OUTPATIENT
Start: 2019-08-28 | End: 2019-08-29

## 2019-08-28 RX ORDER — ACETAMINOPHEN 650 MG/1
650 SUPPOSITORY RECTAL EVERY 6 HOURS PRN
Status: DISCONTINUED | OUTPATIENT
Start: 2019-08-28 | End: 2019-08-29

## 2019-08-28 RX ORDER — SODIUM CHLORIDE, SODIUM LACTATE, POTASSIUM CHLORIDE, CALCIUM CHLORIDE 600; 310; 30; 20 MG/100ML; MG/100ML; MG/100ML; MG/100ML
INJECTION, SOLUTION INTRAVENOUS CONTINUOUS
Status: DISCONTINUED | OUTPATIENT
Start: 2019-08-28 | End: 2019-08-29

## 2019-08-28 RX ORDER — DEXTROSE, SODIUM CHLORIDE, SODIUM LACTATE, POTASSIUM CHLORIDE, AND CALCIUM CHLORIDE 5; .6; .31; .03; .02 G/100ML; G/100ML; G/100ML; G/100ML; G/100ML
INJECTION, SOLUTION INTRAVENOUS CONTINUOUS
Status: DISCONTINUED | OUTPATIENT
Start: 2019-08-28 | End: 2019-08-29

## 2019-08-28 RX ORDER — LIDOCAINE HYDROCHLORIDE 10 MG/ML
30 INJECTION, SOLUTION EPIDURAL; INFILTRATION; INTRACAUDAL; PERINEURAL ONCE
Status: DISCONTINUED | OUTPATIENT
Start: 2019-08-28 | End: 2019-08-29

## 2019-08-28 RX ORDER — TRISODIUM CITRATE DIHYDRATE AND CITRIC ACID MONOHYDRATE 500; 334 MG/5ML; MG/5ML
30 SOLUTION ORAL AS NEEDED
Status: DISCONTINUED | OUTPATIENT
Start: 2019-08-28 | End: 2019-08-29

## 2019-08-29 ENCOUNTER — ANESTHESIA EVENT (OUTPATIENT)
Dept: OBGYN UNIT | Facility: HOSPITAL | Age: 34
End: 2019-08-29
Payer: COMMERCIAL

## 2019-08-29 ENCOUNTER — ANESTHESIA (OUTPATIENT)
Dept: OBGYN UNIT | Facility: HOSPITAL | Age: 34
End: 2019-08-29
Payer: COMMERCIAL

## 2019-08-29 LAB
GLUCOSE BLDC GLUCOMTR-MCNC: 107 MG/DL (ref 70–99)
GLUCOSE BLDC GLUCOMTR-MCNC: 92 MG/DL (ref 70–99)

## 2019-08-29 PROCEDURE — 82962 GLUCOSE BLOOD TEST: CPT

## 2019-08-29 PROCEDURE — 0HQ9XZZ REPAIR PERINEUM SKIN, EXTERNAL APPROACH: ICD-10-PCS | Performed by: OBSTETRICS & GYNECOLOGY

## 2019-08-29 RX ORDER — AMMONIA INHALANTS 0.04 G/.3ML
0.3 INHALANT RESPIRATORY (INHALATION) AS NEEDED
Status: DISCONTINUED | OUTPATIENT
Start: 2019-08-29 | End: 2019-08-31

## 2019-08-29 RX ORDER — BISACODYL 10 MG
10 SUPPOSITORY, RECTAL RECTAL ONCE AS NEEDED
Status: DISCONTINUED | OUTPATIENT
Start: 2019-08-29 | End: 2019-08-31

## 2019-08-29 RX ORDER — ONDANSETRON 2 MG/ML
4 INJECTION INTRAMUSCULAR; INTRAVENOUS EVERY 6 HOURS PRN
Status: DISCONTINUED | OUTPATIENT
Start: 2019-08-29 | End: 2019-08-29

## 2019-08-29 RX ORDER — NALBUPHINE HCL 10 MG/ML
2.5 AMPUL (ML) INJECTION
Status: DISCONTINUED | OUTPATIENT
Start: 2019-08-29 | End: 2019-08-29

## 2019-08-29 RX ORDER — ONDANSETRON 2 MG/ML
4 INJECTION INTRAMUSCULAR; INTRAVENOUS EVERY 6 HOURS PRN
Status: DISCONTINUED | OUTPATIENT
Start: 2019-08-29 | End: 2019-08-31

## 2019-08-29 RX ORDER — LIDOCAINE HYDROCHLORIDE AND EPINEPHRINE 15; 5 MG/ML; UG/ML
INJECTION, SOLUTION EPIDURAL AS NEEDED
Status: DISCONTINUED | OUTPATIENT
Start: 2019-08-29 | End: 2019-08-30 | Stop reason: SURG

## 2019-08-29 RX ORDER — LIDOCAINE HYDROCHLORIDE AND EPINEPHRINE 20; 5 MG/ML; UG/ML
20 INJECTION, SOLUTION EPIDURAL; INFILTRATION; INTRACAUDAL; PERINEURAL ONCE
Status: DISCONTINUED | OUTPATIENT
Start: 2019-08-29 | End: 2019-08-29

## 2019-08-29 RX ORDER — DOCUSATE SODIUM 100 MG/1
100 CAPSULE, LIQUID FILLED ORAL 2 TIMES DAILY
Status: DISCONTINUED | OUTPATIENT
Start: 2019-08-29 | End: 2019-08-31

## 2019-08-29 RX ORDER — SIMETHICONE 80 MG
80 TABLET,CHEWABLE ORAL 3 TIMES DAILY PRN
Status: DISCONTINUED | OUTPATIENT
Start: 2019-08-29 | End: 2019-08-31

## 2019-08-29 RX ORDER — SODIUM CHLORIDE 0.9 % (FLUSH) 0.9 %
10 SYRINGE (ML) INJECTION AS NEEDED
Status: DISCONTINUED | OUTPATIENT
Start: 2019-08-29 | End: 2019-08-31

## 2019-08-29 RX ORDER — CHOLECALCIFEROL (VITAMIN D3) 25 MCG
1 TABLET,CHEWABLE ORAL DAILY
Status: DISCONTINUED | OUTPATIENT
Start: 2019-08-29 | End: 2019-08-31

## 2019-08-29 RX ORDER — ACETAMINOPHEN 325 MG/1
650 TABLET ORAL EVERY 6 HOURS PRN
Status: DISCONTINUED | OUTPATIENT
Start: 2019-08-29 | End: 2019-08-31

## 2019-08-29 RX ORDER — IBUPROFEN 600 MG/1
600 TABLET ORAL EVERY 6 HOURS
Status: DISCONTINUED | OUTPATIENT
Start: 2019-08-29 | End: 2019-08-31

## 2019-08-29 RX ORDER — BUPIVACAINE HYDROCHLORIDE 2.5 MG/ML
10 INJECTION, SOLUTION EPIDURAL; INFILTRATION; INTRACAUDAL ONCE
Status: COMPLETED | OUTPATIENT
Start: 2019-08-29 | End: 2019-08-29

## 2019-08-29 RX ORDER — DIAPER,BRIEF,INFANT-TODD,DISP
1 EACH MISCELLANEOUS EVERY 6 HOURS PRN
Status: DISCONTINUED | OUTPATIENT
Start: 2019-08-29 | End: 2019-08-31

## 2019-08-29 RX ORDER — ONDANSETRON 2 MG/ML
INJECTION INTRAMUSCULAR; INTRAVENOUS
Status: COMPLETED
Start: 2019-08-29 | End: 2019-08-29

## 2019-08-29 RX ADMIN — LIDOCAINE HYDROCHLORIDE AND EPINEPHRINE 3 ML: 15; 5 INJECTION, SOLUTION EPIDURAL at 10:46:00

## 2019-08-29 RX ADMIN — BUPIVACAINE HYDROCHLORIDE 5 ML: 2.5 INJECTION, SOLUTION EPIDURAL; INFILTRATION; INTRACAUDAL at 10:48:00

## 2019-08-29 NOTE — PLAN OF CARE
Problem: Patient Centered Care  Goal: Patient preferences are identified and integrated in the patient's plan of care  Description  Interventions:  - What would you like us to know as we care for you?   - Provide timely, complete, and accurate informatio Provide information as needed about early infant feeding cues (e.g., rooting, lip smacking, sucking fingers/hand) versus late cue of crying.  - Discuss/demonstrate breast feeding aids (e.g., infant sling, nursing footstool/pillows, and breast pumps).   - En needed. Outcome: Progressing   Mom doing well throughout the day. Report received from [facility] on arrival to postpartum. Pain is controlled. Vitals stable. Ambulating independently and voiding freely.  Hand expressed milk for baby in labor recovery, baby

## 2019-08-29 NOTE — PROGRESS NOTES
Patient up to bathroom with assist x 2. Voided 150 ml, bladder scan 71 ml. Patient transferred to mother/baby room 362 per wheelchair in stable condition with baby and personal belongings. Accompanied by significant other and staff.   Report given to [facility]

## 2019-08-29 NOTE — L&D DELIVERY NOTE
Nga Son [[id number]]    Labor Events     labor?:  No   steroids?:  None  Antibiotics received during labor?:  No  Antibiotics (enter # doses in comment):  none  Rupture date/time:  2019 0845     Rupture type:  AROM  Fluid co Apgars    Living status:  Living   Apgar Scoring Key:     0 1 2    Skin color Blue or pale Acrocyanotic Completely pink    Heart rate Absent <100 bpm >100 bpm    Reflex irritability No response Grimace Cry or active withdrawal    Muscle tone Limp Some fl 1st degree    Delivery Complications  none    Neonatologist Present: no  Delivery Comment: pt pushed to deliver a viable infant in KEEGAN position.       Intake/Output   EBL:  150ml    Balta Helton MD   8/29/2019  2:56 PM

## 2019-08-29 NOTE — PROGRESS NOTES
[facility]    Labor Progress Note    Cameron Moser Patient Status:  Inpatient    3/4/1985 MRN [id number]   Location [address]  Attending Tonny Guerrero MD   [facility] Day # 1 PCP DO Talib Mcgowan affecting second pregnancy  1) IOL, BPs currently well controlled,   arom performed, pitocin turned off x 1 hour then restart at 6 cm  -pt ok to have epidural when desires  2) GBS negative  3) FHT cat 1              Plan discussed with patient who Chris Pendleton

## 2019-08-29 NOTE — H&P
[id number] [facility] Patient Status:  Inpatient    3/4/1985 MRN [id number]   Location [address] [place] [address] Attending Saad Baltazar MD   Hosp Day # 0 PCP Saulo Vogel DO     Date of Admi • HENRIETTA  12/09   • OTHER SURGICAL HISTORY      endoscopy-2000   • SPECIAL SERVICE OR REPORT      S/P WISDOM TEETH EXTRACTION     Family History:   Family History   Problem Relation Age of Onset   • Heart Disorder Other    • Heart Attack Father    • Heart ICD 10 for .410 Disp: 200 each Rfl: 5 Taking   CONTOUR NEXT TEST In Vitro Strip Test blood sugar fasting and 2 hours post meal 4 times per day.  ICD 10 for .410 Disp: 150 each Rfl: 5 Taking   Acetone, Urine, Test (KETOSTIX) In [facility]

## 2019-08-29 NOTE — ANESTHESIA PREPROCEDURE EVALUATION
Anesthesia PreOp Note    HPI:     Miriam Bethea is a 37year old female who presents for preoperative consultation requested by: * No surgeons listed *    Date of Surgery: 8/29/2019    * No procedures listed *  Indication: * No pre-op diagnosis entered * Gestational hypertension affecting second pregnancy 8/28/2019   • HEADACHES     migraines   • Hx of diseases NEC     HX OF NARCOLEPSY   • HYPERLIPIDEMIA    • Kidney disease     nephritis as a child   • Narcolepsy    • SEASONAL ALLERGIES     not on meds   • daily ICD 10 for .410 Disp: 50 each Rfl: 5 Taking       Current Facility-Administered Medications Ordered in Epic:  ondansetron HCl (ZOFRAN) injection 4 mg 4 mg Intravenous Q6H PRN Fatimah OSORIO MD 4 mg at 08/29/19 0246    lactated ringers IV christiana mL/hr at 08/29/19 1000 6 minal-units/min at 08/29/19 1000   famoTIDine (PEPCID) injection 20 mg 20 mg Intravenous Nightly Renata OSORIO MD 20 mg at 08/28/19 0908      No current [facility]-ordered outpatient medications on file.       Amoxil                  H Minutes per session: Not on file      Stress: Not on file    Relationships      Social connections:        Talks on phone: Not on file        Gets together: Not on file        Attends [employer] service: Not on file        Active member of club or organizat temperature is 98.7 °F (37.1 °C). Her blood pressure is 112/65 and her pulse is 79.  Her respiration is 16.    08/29/19  0700 08/29/19  0800 08/29/19  0857 08/29/19  1000   BP: 123/72 111/73  112/65   Pulse: 85 80  79   Resp:  16     Temp:  98.6 °F (37 °C)

## 2019-08-29 NOTE — ANESTHESIA PROCEDURE NOTES
Labor Analgesia  Performed by: George Hou MD  Authorized by: George Hou MD     Patient Location:  OB  Start Time:  8/29/2019 10:35 AM  End Time:  8/29/2019 10:47 AM  Site Identification: surface landmarks    Reason for Block: labor epidural    Anes

## 2019-08-30 PROBLEM — O09.521 MULTIGRAVIDA OF ADVANCED MATERNAL AGE IN FIRST TRIMESTER: Status: RESOLVED | Noted: 2019-02-06 | Resolved: 2019-08-29

## 2019-08-30 PROBLEM — O09.899 HX OF PRETERM DELIVERY, CURRENTLY PREGNANT: Status: RESOLVED | Noted: 2019-02-06 | Resolved: 2019-08-29

## 2019-08-30 PROBLEM — O34.40 HX LEEP (LOOP ELECTROSURGICAL EXCISION PROCEDURE), CERVIX, PREGNANCY: Status: RESOLVED | Noted: 2019-02-06 | Resolved: 2019-08-29

## 2019-08-30 PROBLEM — Z98.890 HX LEEP (LOOP ELECTROSURGICAL EXCISION PROCEDURE), CERVIX, PREGNANCY: Status: RESOLVED | Noted: 2019-02-06 | Resolved: 2019-08-29

## 2019-08-30 LAB
BASOPHILS # BLD AUTO: 0.04 X10(3) UL (ref 0–0.2)
BASOPHILS NFR BLD AUTO: 0.3 %
DEPRECATED RDW RBC AUTO: 46.1 FL (ref 35.1–46.3)
EOSINOPHIL # BLD AUTO: 0.09 X10(3) UL (ref 0–0.7)
EOSINOPHIL NFR BLD AUTO: 0.7 %
ERYTHROCYTE [DISTWIDTH] IN BLOOD BY AUTOMATED COUNT: 13.6 % (ref 11–15)
HCT VFR BLD AUTO: 33.5 % (ref 35–48)
HGB BLD-MCNC: 10.9 G/DL (ref 12–16)
IMM GRANULOCYTES # BLD AUTO: 0.11 X10(3) UL (ref 0–1)
IMM GRANULOCYTES NFR BLD: 0.9 %
LYMPHOCYTES # BLD AUTO: 1.87 X10(3) UL (ref 1–4)
LYMPHOCYTES NFR BLD AUTO: 15.5 %
MCH RBC QN AUTO: 30.1 PG (ref 26–34)
MCHC RBC AUTO-ENTMCNC: 32.5 G/DL (ref 31–37)
MCV RBC AUTO: 92.5 FL (ref 80–100)
MONOCYTES # BLD AUTO: 1.02 X10(3) UL (ref 0.1–1)
MONOCYTES NFR BLD AUTO: 8.5 %
NEUTROPHILS # BLD AUTO: 8.94 X10 (3) UL (ref 1.5–7.7)
NEUTROPHILS # BLD AUTO: 8.94 X10(3) UL (ref 1.5–7.7)
NEUTROPHILS NFR BLD AUTO: 74.1 %
PLATELET # BLD AUTO: 249 10(3)UL (ref 150–450)
RBC # BLD AUTO: 3.62 X10(6)UL (ref 3.8–5.3)
WBC # BLD AUTO: 12.1 X10(3) UL (ref 4–11)

## 2019-08-30 PROCEDURE — 85025 COMPLETE CBC W/AUTO DIFF WBC: CPT | Performed by: OBSTETRICS & GYNECOLOGY

## 2019-08-30 NOTE — ANESTHESIA POSTPROCEDURE EVALUATION
Patient: Iraida Gunn    Procedure Summary     Date:  08/29/19 Room / Location:      Anesthesia Start:  4227 Anesthesia Stop:  08/30/19 0237    Procedure:  LABOR ANALGESIA Diagnosis:      Scheduled Providers:   Anesthesiologist:  Vahid Jacobson MD

## 2019-08-30 NOTE — PLAN OF CARE
Problem: Patient Centered Care  Goal: Patient preferences are identified and integrated in the patient's plan of care  Description  Interventions:  - What would you like us to know as we care for you?   - Provide timely, complete, and accurate informatio Provide information as needed about early infant feeding cues (e.g., rooting, lip smacking, sucking fingers/hand) versus late cue of crying.  - Discuss/demonstrate breast feeding aids (e.g., infant sling, nursing footstool/pillows, and breast pumps).   - En

## 2019-08-30 NOTE — PROGRESS NOTES
[place] [facility]    OB/Gyne Post  Progress Note      Dennise Purcell Patient Status:  Inpatient    3/4/1985 MRN [id number]   Location [facility] 3SE Attending Bala Lambert MD   [facility] Day # 2 PCP DO Reilly Andrew infant circumcision risks including, but not limited to, bleeding, infection, trauma to other tissue, and need for further procedures. The patient expressed understanding, denied questions, and wishes to proceed with the procedure for her son.       KECIA

## 2019-08-31 VITALS
RESPIRATION RATE: 16 BRPM | WEIGHT: 184.81 LBS | SYSTOLIC BLOOD PRESSURE: 135 MMHG | HEART RATE: 66 BPM | DIASTOLIC BLOOD PRESSURE: 76 MMHG | TEMPERATURE: 98 F | BODY MASS INDEX: 36.28 KG/M2 | HEIGHT: 60 IN | OXYGEN SATURATION: 95 %

## 2019-08-31 RX ORDER — IBUPROFEN 600 MG/1
600 TABLET ORAL EVERY 6 HOURS
Qty: 30 TABLET | Refills: 0 | Status: SHIPPED | OUTPATIENT
Start: 2019-08-31 | End: 2020-01-20

## 2019-08-31 NOTE — DISCHARGE SUMMARY
[place] [facility]    Obstetrical Discharge Summary    Syeda Huston Patient Status:  Inpatient    3/4/1985 MRN [id number]   Location [facility] 3SE Attending Sahra Langley MD   Hosp Day # 3 PCP Gabrielle Lewis DO     Date of Admissi [[id number]]  male infant Information for the patient's : Juanjose Kulwinder [[id number]]  6 lb 10.9 oz (3.03 kg)   Apgars:  1 minute: 7               5 minutes: 9                        10 minutes:        Postpartum Course: Her postpartum course wa 10/31/2020 Date   POCT GLUCOSE    Collection Time: 08/28/19  7:12 PM   Result Value Ref Range    POC Glucose  90 70 - 99   CBC, PLATELET; NO DIFFERENTIAL    Collection Time: 08/28/19  7:46 PM   Result Value Ref Range    WBC 12.0 (H) 4.0 - 11.0 x10(3) uL AM   Result Value Ref Range    WBC 12.1 (H) 4.0 - 11.0 x10(3) uL    RBC 3.62 (L) 3.80 - 5.30 x10(6)uL    HGB 10.9 (L) 12.0 - 16.0 g/dL    HCT 33.5 (L) 35.0 - 48.0 %    MCV 92.5 80.0 - 100.0 fL    MCH 30.1 26.0 - 34.0 pg    MCHC 32.5 31.0 - 37.0 g/dL    [facility]DW Follow-up Information     Call [facility] Lactation Services. Why:  As needed  Contact information:  [address] [place] [address] [phone]           Jan Frost MD In 6 weeks.     Specialty:  OBSTETRICS & [place]land Opitz

## 2019-08-31 NOTE — PROGRESS NOTES
[place] [facility] - [facility]    OB/Gyne Post  Progress Note      Lyle Caldera Patient Status:  Inpatient    3/4/1985 MRN [id number]   Location [facility] 3SE Attending Marcial Escobedo MD   Hosp Day # 3 PCP Myra Conrad DO       Subjective spontaneous vaginal, PPD# 2   Patient Active Problem List:     Severe cervical dysplasia     Hyperlipidemia     Migraine     Foot pain     Cervicalgia     Cervical stenosis of spine     Herniation of cervical intervertebral disc with radiculopathy     Wili Han

## 2020-09-17 PROBLEM — N20.0 HYPOCITRATURIC CALCIUM NEPHROLITHIASIS: Status: ACTIVE | Noted: 2020-09-17

## 2022-01-29 ENCOUNTER — LAB ENCOUNTER (OUTPATIENT)
Dept: LAB | Facility: HOSPITAL | Age: 37
End: 2022-01-29
Attending: INTERNAL MEDICINE
Payer: MEDICAID

## 2022-01-29 DIAGNOSIS — Z01.818 PREOP TESTING: Primary | ICD-10-CM

## 2022-01-30 LAB — SARS-COV-2 RNA RESP QL NAA+PROBE: NOT DETECTED

## 2022-01-31 ENCOUNTER — ANESTHESIA EVENT (OUTPATIENT)
Dept: ENDOSCOPY | Facility: HOSPITAL | Age: 37
End: 2022-01-31
Payer: MEDICAID

## 2022-02-01 ENCOUNTER — HOSPITAL ENCOUNTER (OUTPATIENT)
Facility: HOSPITAL | Age: 37
Setting detail: HOSPITAL OUTPATIENT SURGERY
Discharge: HOME | End: 2022-02-01
Attending: INTERNAL MEDICINE | Admitting: INTERNAL MEDICINE
Payer: MEDICAID

## 2022-02-01 ENCOUNTER — ANESTHESIA (OUTPATIENT)
Dept: ENDOSCOPY | Facility: HOSPITAL | Age: 37
End: 2022-02-01
Payer: MEDICAID

## 2022-02-01 VITALS
OXYGEN SATURATION: 99 % | HEIGHT: 61 IN | WEIGHT: 186 LBS | RESPIRATION RATE: 18 BRPM | HEART RATE: 75 BPM | SYSTOLIC BLOOD PRESSURE: 115 MMHG | BODY MASS INDEX: 35.12 KG/M2 | TEMPERATURE: 98 F | DIASTOLIC BLOOD PRESSURE: 69 MMHG

## 2022-02-01 DIAGNOSIS — R11.10 VOMITING, UNSPECIFIED VOMITING TYPE, UNSPECIFIED WHETHER NAUSEA PRESENT: ICD-10-CM

## 2022-02-01 DIAGNOSIS — R10.12 LUQ PAIN: ICD-10-CM

## 2022-02-01 LAB — B-HCG UR QL: NEGATIVE

## 2022-02-01 PROCEDURE — 0DBN8ZX EXCISION OF SIGMOID COLON, VIA NATURAL OR ARTIFICIAL OPENING ENDOSCOPIC, DIAGNOSTIC: ICD-10-PCS | Performed by: INTERNAL MEDICINE

## 2022-02-01 PROCEDURE — 0DB98ZX EXCISION OF DUODENUM, VIA NATURAL OR ARTIFICIAL OPENING ENDOSCOPIC, DIAGNOSTIC: ICD-10-PCS | Performed by: INTERNAL MEDICINE

## 2022-02-01 PROCEDURE — 0DB78ZX EXCISION OF STOMACH, PYLORUS, VIA NATURAL OR ARTIFICIAL OPENING ENDOSCOPIC, DIAGNOSTIC: ICD-10-PCS | Performed by: INTERNAL MEDICINE

## 2022-02-01 PROCEDURE — 0DBE8ZX EXCISION OF LARGE INTESTINE, VIA NATURAL OR ARTIFICIAL OPENING ENDOSCOPIC, DIAGNOSTIC: ICD-10-PCS | Performed by: INTERNAL MEDICINE

## 2022-02-01 PROCEDURE — 0DBP8ZX EXCISION OF RECTUM, VIA NATURAL OR ARTIFICIAL OPENING ENDOSCOPIC, DIAGNOSTIC: ICD-10-PCS | Performed by: INTERNAL MEDICINE

## 2022-02-01 PROCEDURE — 0DBB8ZX EXCISION OF ILEUM, VIA NATURAL OR ARTIFICIAL OPENING ENDOSCOPIC, DIAGNOSTIC: ICD-10-PCS | Performed by: INTERNAL MEDICINE

## 2022-02-01 PROCEDURE — 88305 TISSUE EXAM BY PATHOLOGIST: CPT | Performed by: INTERNAL MEDICINE

## 2022-02-01 PROCEDURE — 81025 URINE PREGNANCY TEST: CPT

## 2022-02-01 RX ORDER — SODIUM CHLORIDE, SODIUM LACTATE, POTASSIUM CHLORIDE, CALCIUM CHLORIDE 600; 310; 30; 20 MG/100ML; MG/100ML; MG/100ML; MG/100ML
INJECTION, SOLUTION INTRAVENOUS CONTINUOUS
Status: DISCONTINUED | OUTPATIENT
Start: 2022-02-01 | End: 2022-02-01

## 2022-02-01 RX ORDER — LIDOCAINE HYDROCHLORIDE 10 MG/ML
INJECTION, SOLUTION EPIDURAL; INFILTRATION; INTRACAUDAL; PERINEURAL AS NEEDED
Status: DISCONTINUED | OUTPATIENT
Start: 2022-02-01 | End: 2022-02-01 | Stop reason: SURG

## 2022-02-01 RX ORDER — DEXTROSE MONOHYDRATE 25 G/50ML
50 INJECTION, SOLUTION INTRAVENOUS
Status: DISCONTINUED | OUTPATIENT
Start: 2022-02-01 | End: 2022-02-01

## 2022-02-01 RX ORDER — ONDANSETRON HYDROCHLORIDE 8 MG/1
8 TABLET, FILM COATED ORAL EVERY 8 HOURS PRN
COMMUNITY

## 2022-02-01 RX ORDER — NALOXONE HYDROCHLORIDE 0.4 MG/ML
80 INJECTION, SOLUTION INTRAMUSCULAR; INTRAVENOUS; SUBCUTANEOUS AS NEEDED
Status: DISCONTINUED | OUTPATIENT
Start: 2022-02-01 | End: 2022-02-01

## 2022-02-01 RX ORDER — HYDROMORPHONE HYDROCHLORIDE 1 MG/ML
0.4 INJECTION, SOLUTION INTRAMUSCULAR; INTRAVENOUS; SUBCUTANEOUS EVERY 5 MIN PRN
Status: DISCONTINUED | OUTPATIENT
Start: 2022-02-01 | End: 2022-02-01

## 2022-02-01 RX ADMIN — SODIUM CHLORIDE, SODIUM LACTATE, POTASSIUM CHLORIDE, CALCIUM CHLORIDE: 600; 310; 30; 20 INJECTION, SOLUTION INTRAVENOUS at 13:53:00

## 2022-02-01 RX ADMIN — LIDOCAINE HYDROCHLORIDE 25 MG: 10 INJECTION, SOLUTION EPIDURAL; INFILTRATION; INTRACAUDAL; PERINEURAL at 13:12:00

## 2022-02-01 NOTE — ANESTHESIA POSTPROCEDURE EVALUATION
[facility] Patient Status:  Hospital Outpatient Surgery   Age/Gender 43year old female MRN [id number]   Location [address] [address] Attending Leticia Valle MD   Hosp Day # 0 PCP Trevor Hirsch DO       Anesthesia Post-op Note    ESOPHAGOGASTRODUODENOSCOPY (EGD) with biopsies Colonoscopy with biopsies and cold snare polypectomy x2    Procedure Summary     Date: 02/01/22 Room / Location: [facility] ENDOSCOPY 03 / 140[facility] ENDOSCOPY    Anesthesia Start: 1518 Anesthesia Stop: 4149    Procedures:       ESOPHAGOGASTRODUODENOSCOPY (EGD) with biopsies Colonoscopy with biopsies and cold snare polypectomy x2 (N/A )      COLONOSCOPY (N/A ) Diagnosis:       LUQ pain      Vomiting, unspecified vomiting type, unspecified whether nausea present      (EGD:  hiatal hernia  COLON: polyps)    Surgeons: Leticia Valle MD Anesthesiologist: Catia Kingsley MD    Anesthesia Type: MAC ASA Status: 2          Anesthesia Type: MAC    Vitals Value Taken Time   /70 02/01/22 1353   Temp 97.8 02/01/22 1353   Pulse 75 02/01/22 1353   Resp 15 02/01/22 1353   SpO2 100 % 02/01/22 1353   Vitals shown include unvalidated device data. Patient Location: Endoscopy    Anesthesia Type: MAC    Airway Patency: patent    Postop Pain Control: adequate    Mental Status: preanesthetic baseline    Nausea/Vomiting: none    Cardiopulmonary/Hydration status: stable euvolemic    Complications: no apparent anesthesia related complications    Postop vital signs: stable    Dental Exam: Unchanged from Preop    Patient to be discharged home when criteria met.

## 2022-02-01 NOTE — OPERATIVE REPORT
PATIENT NAME: Kareen Lechuga  MRN: [id number]  DATE OF OPERATION: 2/1/2022  PREOPERATIVE DIAGNOSIS:   1. Vomiting. 2. Dyspepsia  3. Loose bowel movements with slight elevation of fecal calprotectin  POSTOPERATIVE DIAGNOSES:  1. Small sliding hiatal hernia, Hill grade 2.  2. Colon polyps  PROCEDURE PERFORMED:    1) Upper endoscopy with biopsy  2) Colonoscopy with polypectomy    SURGEON: Florinda Schumacher MD     MEDICATIONS: None    Anesthesia: MAC  Colonoscopy withdrawal time: 18 minutes  Specimen: Duodenal, and random gastric biopsies, random terminal ileum biopsies, random colon biopsies, sigmoid polyp x1, rectal polyp x1  QUALITY OF PREP: Adequate  ESTIMATED BLOOD LOSS: None  CONSENT: Informed and obtained from the patient  COMPLICATIONS: None immediately apparent    PROCEDURE AND FINDINGS:     After the risks and benefits of the procedure were discussed with the patient and all questions were answered, the patient signed informed consent for the procedure. She was placed in the left lateral position and adequate sedation was achieved. the lubricated tip of an Olympus video gastroscope was introduced into the mouth and the esophagus was intubated under direct visualization. A complete examination of the esophagus, stomach and duodenum was performed including retroflexion in the stomach to view the cardia and fundus. The endoscope was straightened and removed, and the procedure was completed. The patient tolerated the procedure well. There were no immediate complications. FINDINGS:  1. Normal esophagus with no evidence of esophagitis, stricture, ulceration, mass or other abnormalities. The squamocolumnar junction was intact. The esophageal mucosa was examined with narrowband imaging as well as high definition white light without obvious abnormality. The squamocolumnar junction was identified at the same level of the top of gastric folds.   There was a 1 cm long sliding hiatal hernia with gastroesophageal flap valve Hill grade 2 the esophagogastric junction was patent. There were no endoscopic features of eosinophilic esophagitis or Jaimes's esophagus. 2. Normal stomach throughout with no evidence of ulcers, masses or other abnormalities. Retroflexion revealed a normal cardia and fundus. The antrum was normal and the pylorus was patent. Random biopsies were taken from the stomach to rule out H. pylori gastritis    3. Normal duodenum to the second portion with no ulcers or masses seen. The major papilla was visualized and appeared unremarkable. Random biopsies were taken to rule out celiac sprue  The patient was then rotated 180 degrees, and a digital rectal exam was performed which revealed no obvious perianal disease or palpable masses within the anal canal. The lubricated tip of an Olympus video adult variable stiffness colonoscope was introduced into the anus and advanced through the colon to the cecum. The cecum was identified by the ileocecal valve and appendiceal orifice. The base of the cecum was normal with no polyps or masses seen. The terminal ileum was intubated and the mucosa appeared normal.  Random biopsies were taken from the terminal ileum. The terminal ileum was inspected up to approximately 10 cm from the ileocecal valve. The colonoscope was then withdrawn and the mucosa was further carefully inspected. Random biopsies were taken from the colon. Of note the distal end clear detachable cap was fitted onto the tip of the scope prior to inserting the scope in the rectum. Multiple passes were performed throughout the various segments of the colon. 4 mm adenomatous appearing polyp was removed from the sigmoid with cold snare polypectomy. There was a 3 mm polyp removed from the rectum with cold snare polypectomy could be hyperplastic. In the rectum, retroflexion revealed  internal hemorrhoids. The endoscope was straightened and removed and the procedure was completed.  The patient tolerated the procedure well. There were no immediate complications. The patient was given a written copy of their results at discharge. IMPRESSION:  1. Findings described above    RECOMMENDATIONS:  1. Await final biopsy results. 2. She may require gastric emptying study. 3. We will consider increasing omeprazole to 20 mg twice a day. 4. There might be a component of functional bowel disease and she may benefit from a trial of TCA. 5. Most likely colonoscopy in 7 years.     MD Tyson Hatfield [address] [place] [address]  Gastroenterology

## 2022-02-03 NOTE — PROGRESS NOTES
She will need HLA DQ2/DQ8 testing. Prior celiac serologies negative. Colonoscopy in seven years and first degree relatives will need initial screening colonoscopy at 29. Will confirm with RA other management recommendations. 1. Await final biopsy results. 2. She may require gastric emptying study. 3. We will consider increasing omeprazole to 20 mg twice a day. 4. There might be a component of functional bowel disease and she may benefit from a trial of TCA. 5. Most likely colonoscopy in 7 years.

## 2022-02-03 NOTE — PROGRESS NOTES
2/3/2022  [address] [place] [address] [phone]    Dear Pavan Ramirez,       Here are the biopsy/pathology findings from your recent EGD (upper endoscopy) and colonoscopy: The biopsy/pathology findings from your upper endoscopy showed:  1. Small bowel (duodenum) biopsies which some changes in the lining that can be seen with celiac disease (gluten allergy). Your prior celiac antibody labs were normal.  We will need to do some further testing to evaluate this further. Please proceed with celiac gene testing (HLA DQ2/DQ8). A person must carry these genes to be able to potentially develop celiac sprue. 2. Stomach biopsies unremarkable. No evidence of H Pylori bacteria. The  biopsy/pathology findings from your colonoscopy showed:  1. Terminal ileum and colon biopsies unremarkable. No evidence of inflammatory changes/colitis. 2.  Colon polyps: adenomatous polyps, which are benign potentially pre-cancerous growths that were removed. These types of polyps can re-occur. Follow-up information:  1. Please go to the lab to have HLA DQ2/DQ8 testing performed for further evaluation for possible celiac sprue. You do not need to be fasting for this test.   2.  Schedule gastric emptying study to check to see if the stomach is emptying well. If there is delay in the emptying this could be a cause for your nausea and vomiting. Call [phone] to arrange for this. 3.  Colonoscopy in seven years given personal history of adenomatous colon polyps  4. Given you were found to have adenomatous colon polyps at a younger age recommend your first degree relatives get screened at a younger age (by the age of 29). If you need any further assistance, please feel free to call [phone]. Thank you for letting us care for you .     Sincerely ,     Dr Arian [facility]  [phone]

## 2022-12-23 ENCOUNTER — APPOINTMENT (OUTPATIENT)
Dept: URGENT CARE | Age: 37
End: 2022-12-23

## (undated) DEVICE — FLOSEAL SEALENT STERILE 10ML

## (undated) DEVICE — SOL  .9 1000ML BTL

## (undated) DEVICE — 1200CC GUARDIAN II: Brand: GUARDIAN

## (undated) DEVICE — NVM5 E ET TUBE 7MM ENT KIT

## (undated) DEVICE — GAUZE SPONGES,12 PLY: Brand: CURITY

## (undated) DEVICE — INTROCAN SAFETY® IV CATHETER 14 GA. X 2 IN., FEP, WINGED: Brand: INTROCAN SAFETY®

## (undated) DEVICE — X-RAY DETECTABLE SPONGES,16 PLY: Brand: VISTEC

## (undated) DEVICE — 3.0MM PRECISION NEURO (MATCH HEAD)

## (undated) DEVICE — FORCEP RADIAL JAW 4

## (undated) DEVICE — TAPE DRSG WTPRF 3IN WTPRF HI

## (undated) DEVICE — CAP SEALING, REVEAL 15.0MM

## (undated) DEVICE — 3M™ TEGADERM™ TRANSPARENT FILM DRESSING, 1626W, 4 IN X 4-3/4 IN (10 CM X 12 CM), 50 EACH/CARTON, 4 CARTON/CASE: Brand: 3M™ TEGADERM™

## (undated) DEVICE — CERVICAL CDS: Brand: MEDLINE INDUSTRIES, INC.

## (undated) DEVICE — 10MM DRILL

## (undated) DEVICE — DRAPE SHEET LG

## (undated) DEVICE — GOWN SURG AERO BLUE PERF XLG

## (undated) DEVICE — PEN: MARKING STD PT 100/CS: Brand: MEDICAL ACTION INDUSTRIES

## (undated) DEVICE — 3M™ RED DOT™ MONITORING ELECTRODE WITH FOAM TAPE AND STICKY GEL, 50/BAG, 20/CASE, 72/PLT 2570: Brand: RED DOT™

## (undated) DEVICE — Device: Brand: DEFENDO AIR/WATER/SUCTION AND BIOPSY VALVE

## (undated) DEVICE — ENDOSCOPY PACK - LOWER: Brand: MEDLINE INDUSTRIES, INC.

## (undated) DEVICE — 20 ML SYRINGE LUER-LOCK TIP: Brand: MONOJECT

## (undated) DEVICE — STERILE POLYISOPRENE POWDER-FREE SURGICAL GLOVES: Brand: PROTEXIS

## (undated) DEVICE — ENDOSCOPY PACK UPPER: Brand: MEDLINE INDUSTRIES, INC.

## (undated) DEVICE — NVM5 NEEDLE MODULE M/E

## (undated) DEVICE — FILTERLINE NASAL ADULT O2/CO2

## (undated) DEVICE — DRAPE SRG 150X54IN LEICA

## (undated) DEVICE — BANDAGE ROLL,100% COTTON, 6 PLY, LARGE: Brand: KERLIX

## (undated) DEVICE — C-ARMOR C-ARM EQUIPMENT COVERS CLEAR STERILE UNIVERSAL FIT 12 PER CASE: Brand: C-ARMOR

## (undated) DEVICE — SUTURE MONOCRYL 3-0 Y936H

## (undated) DEVICE — TRAP SPEC REMOVAL ETRAP 15CM

## (undated) DEVICE — NVM5 NEEDLE MODULE S

## (undated) DEVICE — SUTURE SILK 2-0 SA65H

## (undated) DEVICE — TEMPORARY FIXATION PIN: Brand: CABO

## (undated) DEVICE — SNARE 9MM 230CM 2.4MM EXACTO

## (undated) DEVICE — CERVICAL COLLAR

## (undated) DEVICE — SUTURE VICRYL 2-0 CT-2

## (undated) DEVICE — DISTRACTION SCREW 12MM